# Patient Record
Sex: FEMALE | Race: BLACK OR AFRICAN AMERICAN | NOT HISPANIC OR LATINO
[De-identification: names, ages, dates, MRNs, and addresses within clinical notes are randomized per-mention and may not be internally consistent; named-entity substitution may affect disease eponyms.]

---
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
## 2019-02-14 NOTE — ED PROVIDER NOTE - ALLERGIC/IMMUNOLOGIC NEGATIVE STATEMENT, MLM
Progress Notes by Yadi Gavin at 11/14/18 01:03 PM     Author:  Yadi Gavin Service:  (none) Author Type:       Filed:  11/14/18 01:05 PM Encounter Date:  11/14/2018 Status:  Signed     :  Yadi Gavin ()            Faxed MRI results to Martina with [facility]  [id number]. Please advise as to next scheduled appointment? Close encounter[MT1.1M]       Revision History        User Key Date/Time User Provider Type Action    > MT1.1 11/14/18 01:05 PM Yadi Gavin  Sign    M - Manual            
no dermatitis, no environmental allergies, no food allergies, no immunosuppressive disorder, and no pruritus.

## 2020-08-26 NOTE — ED PROVIDER NOTE - NS_EDPROVIDERDISPOUSERTYPE_ED_A_ED
[FreeTextEntry1] : Healing well with some slough of right nipple. Continue daily dressing changes. No evidence of infection or collection. may switch to compression shirt. F/U in 3 weeks.
Attending Attestation (For Attendings USE Only)...

## 2023-08-04 NOTE — PROGRESS NOTE ADULT - ASSESSMENT
17y yo   s/p vaginal delivery PPD1. Stable. No current complaints.  - Out of bed. Aggressive ambulation.  - Analgesia PRN  - Regular diet  - Check H&H
17y yo   s/p vaginal delivery PPD2. Stable. No current complaints.  - Out of bed  - Analgesia PRN  - Regular diet  - Discharge today per protocol.
None needed